# Patient Record
(demographics unavailable — no encounter records)

---

## 2024-12-11 NOTE — DISCUSSION/SUMMARY
[de-identified] :  The patient has a chronic condition.  The patient was explained the nature of their acute problem.  It was also discussed that without proper treatment there is a possibility of long term sequelae (ie pain, stiffness, etc.). In that scenario more involved treatment (ie injection, bracing or even surgery) might be required.  Due to chronicity, recommend MRI RT ankle to evaluate for partial tear achilles tendon.   WBAT in supportive footwear, heel lifts.   Recommend a course of PT. Ice to affected area. Stretching exercises recommended and demonstrated to patient.   The patient was explained the options as well as benefits of over the counter verses prescription strength nonsteroidal anti-inflammatory medication. Prescription strength medication is recommended to suppress chronic inflammation. The patient opts for a prescription strength medication.

## 2024-12-11 NOTE — PHYSICAL EXAM
[NL (40)] : plantar flexion 40 degrees [NL 30)] : inversion 30 degrees [5___] : eversion 5[unfilled]/5 [2+] : posterior tibialis pulse: 2+ [Normal] : saphenous nerve sensation normal [] : non-antalgic [Right] : right ankle [Weight -] : weightbearing [FreeTextEntry3] : Mild pump bump [FreeTextEntry9] : Small achilles insertion spur.  [TWNoteComboBox7] : dorsiflexion 10 degrees [de-identified] : eversion 15 degrees

## 2024-12-11 NOTE — HISTORY OF PRESENT ILLNESS
[de-identified] : JALEN OSORIO a 55 year old male here for evaluation of his right achilles. Denies trauma. Symptoms since May 2024. Pt. enjoys running and has pain whenever he does this activity. No formal treatment to date. WB in sneakers. No previous injury/problem with right ankle. [FreeTextEntry1] : right achilles.

## 2025-01-08 NOTE — DATA REVIEWED
[MRI] : MRI [Right] : of the right [Ankle] : ankle [I independently reviewed and interpreted images and report] : I independently reviewed and interpreted images and report

## 2025-01-08 NOTE — PHYSICAL EXAM
[NL (40)] : plantar flexion 40 degrees [NL 30)] : inversion 30 degrees [5___] : eversion 5[unfilled]/5 [2+] : posterior tibialis pulse: 2+ [Normal] : saphenous nerve sensation normal [Right] : right ankle [Weight -] : weightbearing [FreeTextEntry9] : Small achilles insertion spur.  [] : non-antalgic [FreeTextEntry3] : Mild pump bump [FreeTextEntry8] : Minimal tenderness.  [TWNoteComboBox7] : dorsiflexion 10 degrees

## 2025-01-08 NOTE — DISCUSSION/SUMMARY
[de-identified] : MRI report and films reviewed with patient. Peroneal brevis partial tear not clinically relevant.  WB in supportive footwear. Stretching exercises daily recommended. Ice to affected area. Continue with heel lifts.  He can increase activities as tolerated and see how he does with return to exercise.  He will follow up prn.

## 2025-01-08 NOTE — HISTORY OF PRESENT ILLNESS
[de-identified] : JALEN OSORIO a 55 year old male here for follow up of his right achilles. Denies trauma. Symptoms since May 2024. Pt. enjoys running and has pain whenever he does this activity. No formal treatment to date. WB in sneakers, using heel lifts. MRI consistent with small partial tears achilles and peroneus brevis tendons. He reports no pain at this time but has not yet returned to running.  [FreeTextEntry1] : right achilles.